# Patient Record
(demographics unavailable — no encounter records)

---

## 2025-02-12 NOTE — ASSESSMENT
[FreeTextEntry1] : -continue with 5mg of Zepbound.  -continue with Lexapro 10 mg.   f/u in 6 months for physical or sooner if needed.

## 2025-02-12 NOTE — COUNSELING
[Behavioral health counseling provided] : Behavioral health counseling provided [Sleep ___ hours/day] : Sleep [unfilled] hours/day [Engage in a relaxing activity] : Engage in a relaxing activity [Potential consequences of obesity discussed] : Potential consequences of obesity discussed [Benefits of weight loss discussed] : Benefits of weight loss discussed [Structured Weight Management Program suggested:] : Structured weight management program suggested [Encouraged to maintain food diary] : Encouraged to maintain food diary [Encouraged to increase physical activity] : Encouraged to increase physical activity [Encouraged to use exercise tracking device] : Encouraged to use exercise tracking device [Needs reinforcement, provided] : Patient needs reinforcement on understanding of disease, goals and obesity follow-up plan; reinforcement was provided [FreeTextEntry4] : 15 [de-identified] : provider spent 5 minutes performing a depression screening on this patient

## 2025-02-12 NOTE — HEALTH RISK ASSESSMENT
[Good] : ~his/her~  mood as  good [No] : No [No falls in past year] : Patient reported no falls in the past year [Little interest or pleasure doing things] : 1) Little interest or pleasure doing things [Feeling down, depressed, or hopeless] : 2) Feeling down, depressed, or hopeless [0] : 2) Feeling down, depressed, or hopeless: Not at all (0) [Never] : Never [Patient reported mammogram was normal] : Patient reported mammogram was normal [Patient reported PAP Smear was normal] : Patient reported PAP Smear was normal [None] : None [With Family] : lives with family [College] : College [# Of Children ___] : has [unfilled] children [Sexually Active] : sexually active [Feels Safe at Home] : Feels safe at home [Fully functional (bathing, dressing, toileting, transferring, walking, feeding)] : Fully functional (bathing, dressing, toileting, transferring, walking, feeding) [Fully functional (using the telephone, shopping, preparing meals, housekeeping, doing laundry, using] : Fully functional and needs no help or supervision to perform IADLs (using the telephone, shopping, preparing meals, housekeeping, doing laundry, using transportation, managing medications and managing finances) [Smoke Detector] : smoke detector [Carbon Monoxide Detector] : carbon monoxide detector [Safety elements used in home] : safety elements used in home [Seat Belt] :  uses seat belt [Sunscreen] : uses sunscreen [Travel to Developing Areas] : travel to developing areas [Caregiver Concerns] : has caregiver concerns [Audit-CScore] : 0 [de-identified] : walks  [de-identified] : following low calorie [[id number]] : 0 [Change in mental status noted] : No change in mental status noted [Language] : denies difficulty with language [Behavior] : denies difficulty with behavior [Learning/Retaining New Information] : denies difficulty learning/retaining new information [Handling Complex Tasks] : denies difficulty handling complex tasks [Reasoning] : denies difficulty with reasoning [Spatial Ability and Orientation] : denies difficulty with spatial ability and orientation [High Risk Behavior] : no high risk behavior [Reports changes in hearing] : Reports no changes in hearing [Reports changes in vision] : Reports no changes in vision [Reports normal functional visual acuity (ie: able to read med bottle)] : Reports poor functional visual acuity.  [Reports changes in dental health] : Reports no changes in dental health [TB Exposure] : is not being exposed to tuberculosis [MammogramDate] : 2022 [PapSmearDate] : 2022

## 2025-02-12 NOTE — HISTORY OF PRESENT ILLNESS
[FreeTextEntry1] : follow-up for medication refill [de-identified] : 2/12/25: Pt presenting for 6 month follow up. Last Physical August 2024. No new complaints. Here for medication refill (Lexapro and Zepbound). Reports consistent weight loss with 5 mg of Zepbound (16 pounds) Reports stable mood. Not currently taking hydrochlorothiazide. work is stressful, but has improved. Patient's daughter doing much better, active in middle school.   2/1/24: school #3 [place] speech [employer] language. Daughter is doing much better, as is mom. Is working through issues with work. Had been losing weight with meds, diet and exercise, but is at a  plateau  8/2/23: losing weight on ozempic; walking in [place]. Has been working with therapist on daughter's school anxiety, which has improved.   3/23/23:  Patient is a 49-year-old female with PMH anxiety and HTN presenting for medication refills. Reports she has been under a great deal of stress with daughter's school anxiety. Takes her medications consistently with no reported side effects. Feels like the lexapro is helping her through the anxiety with her daughter. Also uses other coping mechanisms to help her calm down that are effective. 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 Does not diet, tries to walk for exercise.

## 2025-02-12 NOTE — PLAN
[FreeTextEntry1] : Patient doing well on lexapro and HCTZ. Overall, BP is controlled considering increased stress at this time. Patient will make appt for physical and will recheck BP then.

## 2025-02-12 NOTE — COUNSELING
[Behavioral health counseling provided] : Behavioral health counseling provided [Sleep ___ hours/day] : Sleep [unfilled] hours/day [Engage in a relaxing activity] : Engage in a relaxing activity [Potential consequences of obesity discussed] : Potential consequences of obesity discussed [Benefits of weight loss discussed] : Benefits of weight loss discussed [Structured Weight Management Program suggested:] : Structured weight management program suggested [Encouraged to maintain food diary] : Encouraged to maintain food diary [Encouraged to increase physical activity] : Encouraged to increase physical activity [Encouraged to use exercise tracking device] : Encouraged to use exercise tracking device [Needs reinforcement, provided] : Patient needs reinforcement on understanding of disease, goals and obesity follow-up plan; reinforcement was provided [FreeTextEntry4] : 15 [de-identified] : provider spent 5 minutes performing a depression screening on this patient

## 2025-02-12 NOTE — HISTORY OF PRESENT ILLNESS
[FreeTextEntry1] : follow-up for medication refill [de-identified] : 2/12/25: Pt presenting for 6 month follow up. Last Physical August 2024. No new complaints. Here for medication refill (Lexapro and Zepbound). Reports consistent weight loss with 5 mg of Zepbound (16 pounds) Reports stable mood. Not currently taking hydrochlorothiazide. work is stressful, but has improved. Patient's daughter doing much better, active in middle school.   2/1/24: school #3 [place] [employer]. Daughter is doing much better, as is mom. Is working through issues with work. Had been losing weight with meds, diet and exercise, but is at a  plateau  8/2/23: losing weight on ozempic; walking in [place]. Has been working with therapist on daughter's school anxiety, which has improved.   3/23/23:  Patient is a 49-year-old female with PMH anxiety and HTN presenting for medication refills. Reports she has been under a great deal of stress with daughter's school anxiety. Takes her medications consistently with no reported side effects. Feels like the lexapro is helping her through the anxiety with her daughter. Also uses other coping mechanisms to help her calm down that are effective. 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 Does not diet, tries to walk for exercise.

## 2025-02-12 NOTE — HEALTH RISK ASSESSMENT
[Good] : ~his/her~  mood as  good [No] : No [No falls in past year] : Patient reported no falls in the past year [Little interest or pleasure doing things] : 1) Little interest or pleasure doing things [Feeling down, depressed, or hopeless] : 2) Feeling down, depressed, or hopeless [0] : 2) Feeling down, depressed, or hopeless: Not at all (0) [Never] : Never [Patient reported mammogram was normal] : Patient reported mammogram was normal [Patient reported PAP Smear was normal] : Patient reported PAP Smear was normal [None] : None [With Family] : lives with family [College] : College [# Of Children ___] : has [unfilled] children [Sexually Active] : sexually active [Feels Safe at Home] : Feels safe at home [Fully functional (bathing, dressing, toileting, transferring, walking, feeding)] : Fully functional (bathing, dressing, toileting, transferring, walking, feeding) [Fully functional (using the telephone, shopping, preparing meals, housekeeping, doing laundry, using] : Fully functional and needs no help or supervision to perform IADLs (using the telephone, shopping, preparing meals, housekeeping, doing laundry, using transportation, managing medications and managing finances) [Smoke Detector] : smoke detector [Carbon Monoxide Detector] : carbon monoxide detector [Safety elements used in home] : safety elements used in home [Seat Belt] :  uses seat belt [Sunscreen] : uses sunscreen [Travel to Developing Areas] : travel to developing areas [Caregiver Concerns] : has caregiver concerns [Audit-CScore] : 0 [de-identified] : walks  [de-identified] : following low calorie [[id number]] : 0 [Change in mental status noted] : No change in mental status noted [Language] : denies difficulty with language [Behavior] : denies difficulty with behavior [Learning/Retaining New Information] : denies difficulty learning/retaining new information [Handling Complex Tasks] : denies difficulty handling complex tasks [Reasoning] : denies difficulty with reasoning [Spatial Ability and Orientation] : denies difficulty with spatial ability and orientation [High Risk Behavior] : no high risk behavior [Reports changes in hearing] : Reports no changes in hearing [Reports changes in vision] : Reports no changes in vision [Reports normal functional visual acuity (ie: able to read med bottle)] : Reports poor functional visual acuity.  [Reports changes in dental health] : Reports no changes in dental health [TB Exposure] : is not being exposed to tuberculosis [MammogramDate] : 2022 [PapSmearDate] : 2022

## 2025-02-12 NOTE — DATA REVIEWED
[FreeTextEntry1] : Mammo completed 1/31/25: no mammographic or ultrasonographic evidence of malignancy